# Patient Record
Sex: FEMALE
[De-identification: names, ages, dates, MRNs, and addresses within clinical notes are randomized per-mention and may not be internally consistent; named-entity substitution may affect disease eponyms.]

---
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
## 2021-06-26 ENCOUNTER — TRANSCRIPTION ENCOUNTER (OUTPATIENT)
Age: 21
End: 2021-06-26

## 2022-08-11 ENCOUNTER — APPOINTMENT (OUTPATIENT)
Dept: AFTER HOURS CARE | Facility: EMERGENCY ROOM | Age: 22
End: 2022-08-11

## 2022-08-11 DIAGNOSIS — R10.9 UNSPECIFIED ABDOMINAL PAIN: ICD-10-CM

## 2022-08-11 PROCEDURE — 99204 OFFICE O/P NEW MOD 45 MIN: CPT | Mod: 95

## 2022-08-12 ENCOUNTER — TRANSCRIPTION ENCOUNTER (OUTPATIENT)
Age: 22
End: 2022-08-12

## 2022-08-12 PROBLEM — R10.9 ABDOMINAL PAIN IN FEMALE: Status: ACTIVE | Noted: 2022-08-12

## 2022-08-12 NOTE — REVIEW OF SYSTEMS
[Abdominal Pain] : abdominal pain [Diarrhea] : diarrhea [Fever] : no fever [Chills] : no chills [Discharge] : no discharge [Earache] : no earache [Chest Pain] : no chest pain [Palpitations] : no palpitations [Shortness Of Breath] : no shortness of breath [Cough] : no cough [Nausea] : no nausea [Constipation] : no constipation [Vomiting] : no vomiting [Dysuria] : no dysuria [Hematuria] : no hematuria [Joint Pain] : no joint pain [Muscle Pain] : no muscle pain [Itching] : no itching [Skin Rash] : no skin rash [Headache] : no headache [Dizziness] : no dizziness

## 2022-08-12 NOTE — HISTORY OF PRESENT ILLNESS
[Home] : at home, [unfilled] , at the time of the visit. [Other Location: e.g. Home (Enter Location, City,State)___] : at [unfilled] [Verbal consent obtained from patient] : the patient, [unfilled] [FreeTextEntry8] : 22 yo F with lower abdominal pain for 2 weeks and soft stools and symptoms seemed to have improved then\par worsened went to urgent care where UA was positive for GBS and patient was placed on Macrobid and then so her\par gyn and was placed on Bactrum for 3 days and now presenting with ongoing discomfort with no fever , bleeding, n/v.\par States that pain improved with BM and the BM are soft\par Also pt is due to have her period

## 2022-08-12 NOTE — PHYSICAL EXAM
[No Acute Distress] : no acute distress [EOMI] : extraocular movements intact [Normal Outer Ear/Nose] : the outer ears and nose were normal in appearance [Supple] : supple [No Respiratory Distress] : no respiratory distress  [No Accessory Muscle Use] : no accessory muscle use [Normal Insight/Judgement] : insight and judgment were intact [de-identified] : patient palapted her abd with no illicit pain and was able to jump multiple times with no pain  [de-identified] : no rash to the face  [de-identified] : moves all ext as she was able to waklk and jump  [de-identified] : alert and orintyed with wicoh speech

## 2022-08-12 NOTE — PLAN
[Gastroenterology] : Gastroenterology [Primary Care/Internal Medicine/PMD] : Primary Care/Internal Medicine/PMD [FreeTextEntry1] : 20 yo F with lower abd pain for two weeks not in distress patient reluctant to o the the ED for evaluation and\par understand the limitations of evaluation . at this point patient does not want to go to the Ed and will follow up with\par GI\par \par -- supportive care\par -- bland diet\par -- motrin for pain

## 2022-09-10 ENCOUNTER — NON-APPOINTMENT (OUTPATIENT)
Age: 22
End: 2022-09-10

## 2024-04-08 ENCOUNTER — NON-APPOINTMENT (OUTPATIENT)
Age: 24
End: 2024-04-08

## 2024-11-04 ENCOUNTER — APPOINTMENT (OUTPATIENT)
Dept: OTOLARYNGOLOGY | Facility: CLINIC | Age: 24
End: 2024-11-04